# Patient Record
Sex: MALE | Race: WHITE | NOT HISPANIC OR LATINO | URBAN - METROPOLITAN AREA
[De-identification: names, ages, dates, MRNs, and addresses within clinical notes are randomized per-mention and may not be internally consistent; named-entity substitution may affect disease eponyms.]

---

## 2017-05-09 ENCOUNTER — OFFICE (OUTPATIENT)
Dept: URBAN - METROPOLITAN AREA CLINIC 101 | Facility: CLINIC | Age: 77
End: 2017-05-09
Payer: COMMERCIAL

## 2017-05-09 VITALS
TEMPERATURE: 97.9 F | HEART RATE: 81 BPM | WEIGHT: 176 LBS | DIASTOLIC BLOOD PRESSURE: 68 MMHG | SYSTOLIC BLOOD PRESSURE: 136 MMHG | HEIGHT: 70 IN

## 2017-05-09 DIAGNOSIS — E73.8 OTHER LACTOSE INTOLERANCE: ICD-10-CM

## 2017-05-09 DIAGNOSIS — R10.13 EPIGASTRIC PAIN: ICD-10-CM

## 2017-05-09 DIAGNOSIS — Z86.010 PERSONAL HISTORY OF COLONIC POLYPS: ICD-10-CM

## 2017-05-09 DIAGNOSIS — R10.30 LOWER ABDOMINAL PAIN, UNSPECIFIED: ICD-10-CM

## 2017-05-09 DIAGNOSIS — R19.4 CHANGE IN BOWEL HABIT: ICD-10-CM

## 2017-05-09 PROCEDURE — 99214 OFFICE O/P EST MOD 30 MIN: CPT

## 2017-05-09 RX ORDER — LANSOPRAZOLE 30 MG/1
30 CAPSULE, DELAYED RELEASE PELLETS ORAL
Qty: 30 | Refills: 11 | Status: COMPLETED
Start: 2017-05-09 | End: 2018-02-08

## 2017-05-09 RX ORDER — DICYCLOMINE HYDROCHLORIDE 20.6 MG/1
TABLET ORAL
Qty: 60 | Refills: 1 | Status: COMPLETED
Start: 2017-05-09 | End: 2018-02-08

## 2017-06-09 ENCOUNTER — OFFICE (OUTPATIENT)
Dept: URBAN - METROPOLITAN AREA CLINIC 101 | Facility: CLINIC | Age: 77
End: 2017-06-09

## 2017-06-09 VITALS
TEMPERATURE: 97.5 F | HEIGHT: 70 IN | SYSTOLIC BLOOD PRESSURE: 152 MMHG | HEART RATE: 66 BPM | WEIGHT: 172 LBS | DIASTOLIC BLOOD PRESSURE: 72 MMHG

## 2017-06-09 DIAGNOSIS — Z95.5 PRESENCE OF CORONARY ANGIOPLASTY IMPLANT AND GRAFT: ICD-10-CM

## 2017-06-09 DIAGNOSIS — R93.5 ABNORMAL FINDINGS ON DIAGNOSTIC IMAGING OF OTHER ABDOMINAL R: ICD-10-CM

## 2017-06-09 DIAGNOSIS — Z86.010 PERSONAL HISTORY OF COLONIC POLYPS: ICD-10-CM

## 2017-06-09 DIAGNOSIS — K21.0 GASTRO-ESOPHAGEAL REFLUX DISEASE WITH ESOPHAGITIS: ICD-10-CM

## 2017-06-09 DIAGNOSIS — R10.13 EPIGASTRIC PAIN: ICD-10-CM

## 2017-06-09 PROCEDURE — 99214 OFFICE O/P EST MOD 30 MIN: CPT

## 2017-06-09 RX ORDER — ESOMEPRAZOLE MAGNESIUM 40 MG/1
CAPSULE, DELAYED RELEASE ORAL
Qty: 30 | Refills: 11 | Status: COMPLETED
Start: 2017-06-09 | End: 2018-02-08

## 2017-06-09 NOTE — SERVICEHPINOTES
This 75 YO patient with recent cardiac stents is here for follow up of several gastrointestinal symptoms. He still has  epigastric pain,6 of 10, a few times a week. He used to have pain after he ate, but recently he has not had postprandial pain. He denies heartburn, nausea, vomiting. He is not eligible to have EGD due to recent cardiac stents and has not had EGD to his knowledge. His abdominal CT revealed two liver lesions of undeterminate nature fatty liver no colitis or bowel obstruction  normal pancreas, appendix , kidneys. He takes Lansoprazole and has tried Prilosec.JOJO Chapman has had mild alternating bowel pattern . He has a BSS type 4, every 1-2 days. Some days BSS type 4 is followed by "diarrhea" a couple times  a week. He still lower abdominal pain, 4-5 times  a week and hyoscyamine does not help. He denies rectal bleeding, weight loss, constipation. His last colonoscopy was in [place] 4-5 years ago and that doctor is no longer there. He has had one polyp removed in the past. We will obtain records.

## 2017-07-18 ENCOUNTER — OFFICE (OUTPATIENT)
Dept: URBAN - METROPOLITAN AREA CLINIC 101 | Facility: CLINIC | Age: 77
End: 2017-07-18
Payer: COMMERCIAL

## 2017-07-18 VITALS
SYSTOLIC BLOOD PRESSURE: 150 MMHG | HEIGHT: 70 IN | WEIGHT: 172 LBS | TEMPERATURE: 97.9 F | HEART RATE: 69 BPM | DIASTOLIC BLOOD PRESSURE: 63 MMHG

## 2017-07-18 DIAGNOSIS — R10.13 EPIGASTRIC PAIN: ICD-10-CM

## 2017-07-18 DIAGNOSIS — K25.7 CHRONIC GASTRIC ULCER WITHOUT HEMORRHAGE OR PERFORATION: ICD-10-CM

## 2017-07-18 PROCEDURE — 99214 OFFICE O/P EST MOD 30 MIN: CPT

## 2017-07-18 NOTE — SERVICEHPINOTES
This 75 YO patient is here for follow up after upper GI for epigastric pain. He had cardiac stents placed BR1-2017 and is not eligible for EGD/colonoscopy until 1-2018. He was on Omeprazole cardiologist took him off and now Dr Palacios's NP has sent notation that he can be placed on another PPI. He just stated taking Zantac and pain is a little better. He has not lost weight since last visit. No heartburn, nausea, vomiting, dysphagia. His upper GI revealed probable stomach ulcer. He also had a h/o abdominal CT with unusual liver lesions gallstones, DJD, and a liver MRI was recommended . His 6-2017 liver MRI showed showed one liver lesion is a hemangioma one liver lesions could be hemangioma or possible malignancy no masses. His AFP, CEA, CA 19-9 were normal. His wife states that these liver lesions were " worked up " many years ago and assumed benign. He feels well and  denies weight loss, nausea, vomiting, jaundice. He does not drink alcohol. [facility]

## 2017-10-24 ENCOUNTER — OFFICE (OUTPATIENT)
Dept: URBAN - METROPOLITAN AREA CLINIC 101 | Facility: CLINIC | Age: 77
End: 2017-10-24
Payer: COMMERCIAL

## 2017-10-24 VITALS
HEIGHT: 70 IN | SYSTOLIC BLOOD PRESSURE: 127 MMHG | WEIGHT: 174 LBS | DIASTOLIC BLOOD PRESSURE: 66 MMHG | TEMPERATURE: 97.7 F | HEART RATE: 64 BPM

## 2017-10-24 DIAGNOSIS — K21.0 GASTRO-ESOPHAGEAL REFLUX DISEASE WITH ESOPHAGITIS: ICD-10-CM

## 2017-10-24 DIAGNOSIS — K25.7 CHRONIC GASTRIC ULCER WITHOUT HEMORRHAGE OR PERFORATION: ICD-10-CM

## 2017-10-24 PROCEDURE — 99214 OFFICE O/P EST MOD 30 MIN: CPT

## 2018-02-08 ENCOUNTER — OFFICE (OUTPATIENT)
Dept: URBAN - METROPOLITAN AREA CLINIC 101 | Facility: CLINIC | Age: 78
End: 2018-02-08
Payer: COMMERCIAL

## 2018-02-08 VITALS — HEIGHT: 70 IN

## 2018-02-08 DIAGNOSIS — K21.0 GASTRO-ESOPHAGEAL REFLUX DISEASE WITH ESOPHAGITIS: ICD-10-CM

## 2018-02-08 DIAGNOSIS — K25.7 CHRONIC GASTRIC ULCER WITHOUT HEMORRHAGE OR PERFORATION: ICD-10-CM

## 2018-02-08 PROCEDURE — 99214 OFFICE O/P EST MOD 30 MIN: CPT

## 2018-02-08 NOTE — SERVICEHPINOTES
ANABELLE HELLER   is a   77   male who presents for 3 month f/u of epigastric pain. He had cardiac stents placed on 1-2017 and was not eligible for EGD/colonoscopy until 1-2018. He was on Omeprazole cardiologist took him off. He has been taking Zantac, and is doing very well. he hasn't been feeling well since December with a cold. Taking naproxen daily now for joint pains. He is anemic and has an apt with hematologist next week. No signs of GI bleed.No heartburn, nausea, vomiting, dysphagia. His upper GI revealed probable stomach ulcer. He has great appetite, with no dysphagia, odynophagia, or weight loss. No major complaints today.

## 2022-07-18 ENCOUNTER — OFFICE (OUTPATIENT)
Dept: URBAN - METROPOLITAN AREA CLINIC 102 | Facility: CLINIC | Age: 82
End: 2022-07-18
Payer: COMMERCIAL

## 2022-07-18 VITALS
DIASTOLIC BLOOD PRESSURE: 65 MMHG | SYSTOLIC BLOOD PRESSURE: 139 MMHG | WEIGHT: 150 LBS | HEIGHT: 70 IN | HEART RATE: 82 BPM | TEMPERATURE: 97.2 F

## 2022-07-18 DIAGNOSIS — R11.0 NAUSEA: ICD-10-CM

## 2022-07-18 DIAGNOSIS — K92.1 MELENA: ICD-10-CM

## 2022-07-18 PROCEDURE — 99204 OFFICE O/P NEW MOD 45 MIN: CPT | Performed by: INTERNAL MEDICINE
